# Patient Record
Sex: MALE | Race: WHITE | NOT HISPANIC OR LATINO | URBAN - METROPOLITAN AREA
[De-identification: names, ages, dates, MRNs, and addresses within clinical notes are randomized per-mention and may not be internally consistent; named-entity substitution may affect disease eponyms.]

---

## 2020-09-02 ENCOUNTER — OFFICE VISIT - RIVER FALLS (OUTPATIENT)
Dept: FAMILY MEDICINE | Facility: CLINIC | Age: 45
End: 2020-09-02

## 2020-09-02 ASSESSMENT — MIFFLIN-ST. JEOR: SCORE: 2046.55

## 2022-02-11 VITALS
DIASTOLIC BLOOD PRESSURE: 110 MMHG | WEIGHT: 260.4 LBS | SYSTOLIC BLOOD PRESSURE: 160 MMHG | HEART RATE: 82 BPM | TEMPERATURE: 99 F | BODY MASS INDEX: 38.57 KG/M2 | HEIGHT: 69 IN

## 2022-02-16 NOTE — NURSING NOTE
Comprehensive Intake Entered On:  9/2/2020 5:38 PM CDT    Performed On:  9/2/2020 5:34 PM CDT by Jo Glass CMA               Summary   Systolic Blood Pressure :   160 mmHg (HI)    Diastolic Blood Pressure :   110 mmHg (HI)    Mean Arterial Pressure :   127 mmHg   Jo Glass CMA - 9/2/2020 5:47 PM CDT   Chief Complaint :   New Patient: right ear pain, swelling into jaw x 3 days   Weight Measured :   260.4 lb(Converted to: 260 lb 6 oz, 118.12 kg)    Height Measured :   69 in(Converted to: 5 ft 9 in, 175.26 cm)    Body Mass Index :   38.45 kg/m2 (HI)    Body Surface Area :   2.4 m2   Peripheral Pulse Rate :   82 bpm   BP Site :   Right arm   Pulse Site :   Radial artery   BP Method :   Manual   HR Method :   Manual   Temperature Tympanic :   99.0 DegF(Converted to: 37.2 DegC)    Jo Glass CMA - 9/2/2020 5:34 PM CDT   Health Status   Tobacco Use? :   Never smoker   Jo Glass CMA - 9/2/2020 5:34 PM CDT   Consents   Consent for Immunization Exchange :   Consent Granted   Consent for Immunizations to Providers :   Consent Granted   Jo Glass CMA - 9/2/2020 5:34 PM CDT   Meds / Allergies   (As Of: 9/2/2020 5:38:39 PM CDT)   Allergies (Active)   amoxicillin  Estimated Onset Date:   Unspecified ; Created By:   Generated Domain User for [id number]; Reaction Status:   Active ; Substance:   amoxicillin ; Updated By:   Generated Domain User for [id number]; Reviewed Date:   9/2/2020 5:36 PM CDT        Medication List   (As Of: 9/2/2020 5:38:39 PM CDT)   No Known Home Medications     Jo Glass CMA - 9/2/2020 5:37:16 PM           ID Risk Screen   Recent Travel History :   No recent travel   Family Member Travel History :   No recent travel   Other Exposure to Infectious Disease :   Unknown   Jo Glass CMA - 9/2/2020 5:34 PM CDT   Social History   Social History   (As Of: 9/2/2020 5:38:39 PM CDT)   Tobacco:        Snuff   (Last Updated: 9/2/2020 5:37:28 PM CDT by Jo Glass CMA)

## 2022-02-16 NOTE — PROGRESS NOTES
Patient:   HOLLIS RAMIREZ            MRN: [id number]            FIN: [id number]               Age:   45 years     Sex:  Male     :  1975   Associated Diagnoses:   Acute exudative otitis media of right ear; External otitis of right ear   Author:   Wilver Miramontes PA-C      Report Summary   Diagnosis  Acute exudative otitis media of right ear ([id number] H66.001).  External otitis of right ear ([id number]-SD H60.91).  CoursePatient InstructionsOrders   Visit Information      Date of Service: 2020 05:32 pm  Performing Location: [facility]  Encounter#: [id number]   Visit type:  General concerns.    Accompanied by:  Spouse.    Source of history:  Self, Spouse.    History limitation:  None.       Chief Complaint   2020 5:34 PM CDT     New Patient: right ear pain, swelling into jaw x 3 days        History of Present Illness             The patient presents with earache.  The location of the earache is the right ear.  The earache is characterized by pain, discharge and sensation of fullness.  The severity of the earache is moderate.  The earache is episodic, fluctuates in intensity and is worsening.  The earache has lasted for 5 day(s).  Has psoriasis. Right ear canal swollen and draining. More painful. No fever. Mild nasal congestion..  Associated symptoms consist of facial pain, nasal congestion and denies fever.        Review of Systems   Constitutional:  Negative.    Eye:  Negative.    Ear/Nose/Mouth/Throat:  Negative except as documented in history of present illness.    Respiratory:  Negative.    Integumentary:  Negative except as documented in history of present illness.       Health Status   Allergies:    Allergic Reactions (Selected)  Severity Not Documented  Amoxicillin (No reactions were documented)   Medications:  (Selected)   Prescriptions  Prescribed  Floxin Otic 0.3% otic solution: 5 drop(s), Ear-Right, bid, x 7 day(s), # 5 mL, 0 Refill(s), Type: Acute, Pharmacy: [facility],   drop(s) Ear-Right bid,x7 day(s), 69, in, 09/02/20 17:34:00 CDT, Height Measured, 260.4, lb, 09/02/20 17:34:00 CDT, Weight Measured  cefdinir 300 mg oral capsule: = 1 cap(s) ( 300 mg ), PO, q12hr, x 10 day(s), # 20 cap(s), 0 Refill(s), Type: Acute, Pharmacy: [facility], 1 cap(s) Oral q12 hrs,x10 day(s), 69, in, 09/02/20 17:34:00 CDT, Height Measured, 260.4, lb, 09/02/20 17:34:00 CDT, Weight Measured   Problem list:    All Problems  Obesity, Unspecified / ICD-9-.00 / Probable  Anxiety State, Unspecified / ICD-9-.00 / Confirmed  Attention Deficit Disorder of Childhood without Mention of Hyperactivity / ICD-9-.00 / Confirmed  Benign Essential Hypertension / ICD-9-.1 / Confirmed  Tobacco Use Disorder / ICD-9-.1 / Probable      Histories   Past Medical History:    No active or resolved past medical history items have been selected or recorded.   Family History:    No family history items have been selected or recorded.   Procedure history:    No active procedure history items have been selected or recorded.   Social History:        Tobacco Assessment            Snuff        Physical Examination   Vital Signs   9/2/2020 5:34 PM CDT Temperature Tympanic 99.0 DegF    Peripheral Pulse Rate 82 bpm    Pulse Site Radial artery    HR Method Manual    Systolic Blood Pressure 160 mmHg  HI    Diastolic Blood Pressure 110 mmHg  HI    Mean Arterial Pressure 127 mmHg    BP Site Right arm    BP Method Manual      Measurements from flowsheet : Measurements   9/2/2020 5:34 PM CDT Height Measured - Standard 69 in    Weight Measured - Standard 260.4 lb    BSA 2.4 m2    Body Mass Index 38.45 kg/m2  HI      Eye:  Normal conjunctiva.    HENT:  Normocephalic, Oral mucosa is moist, No pharyngeal erythema.         Ear: Right ear, Canal ( Erythematous, Pain with pinna motion ), Tympanic membrane ( Intact, Erythematous, Fluid in middle ear ).         Nose: Both nostrils, Within normal limits, Discharge ( Small  amount, Clear ).    Neck:  Supple, Non-tender, No lymphadenopathy.       Impression and Plan   Diagnosis     Acute exudative otitis media of right ear ([id number] H66.001).     External otitis of right ear ([id number] H60.91).     Course:  Worsening.    Patient Instructions:       Counseled: Patient, Spouse, Regarding diagnosis, Regarding treatment, Regarding medications, Activity, Verbalized understanding.    Orders     Orders (Selected)   Prescriptions  Prescribed  Floxin Otic 0.3% otic solution: 5 drop(s), Ear-Right, bid, x 7 day(s), # 5 mL, 0 Refill(s), Type: Acute, Pharmacy: [facility], 5 drop(s) Ear-Right bid,x7 day(s), 69, in, 09/02/20 17:34:00 CDT, Height Measured, 260.4, lb, 09/02/20 17:34:00 CDT, Weight Measured  cefdinir 300 mg oral capsule: = 1 cap(s) ( 300 mg ), PO, q12hr, x 10 day(s), # 20 cap(s), 0 Refill(s), Type: Acute, Pharmacy: [facility], 1 cap(s) Oral q12 hrs,x10 day(s), 69, in, 09/02/20 17:34:00 CDT, Height Measured, 260.4, lb, 09/02/20 17:34:00 CDT, Weight Measured.     Take medicine as prescribed, side effects discussed.  Tylenol/ibuprofen for fever and discomfort.  Push fluids.  RTC if not improving in 36-48 hours, prior if concerns as we have discussed.

## 2022-02-16 NOTE — NURSING NOTE
Depression Screening Entered On:  9/3/2020 9:40 AM CDT    Performed On:  9/2/2020 9:39 AM CDT by Jo Glass CMA               Depression Screening   Little Interest - Pleasure in Activities :   Not at all   Feeling Down, Depressed, Hopeless :   Not at all   Initial Depression Screen Score :   0 Score   Poor Appetite or Overeating :   Several days   Trouble Falling or Staying Asleep :   Not at all   Feeling Tired or Little Energy :   Not at all   Feeling Bad About Yourself :   Not at all   Trouble Concentrating :   Not at all   Moving or Speaking Slowly :   Not at all   Thoughts Better Off Dead or Hurting Self :   Not at all   Detailed Depression Screen Score :   1    Total Depression Screen Score :   1    Jo Glass CMA - 9/3/2020 9:39 AM CDT

## 2022-02-16 NOTE — NURSING NOTE
CAGE Assessment Entered On:  9/3/2020 9:39 AM CDT    Performed On:  9/2/2020 9:39 AM CDT by Jo Glass CMA               Assessment   Have you ever felt you should cut down on your drinking :   No   Have people annoyed you by criticizing your drinking :   No   Have you ever felt bad or guilty about your drinking :   No   Have you ever taken a drink first thing in the morning to steady your nerves or get rid of a hangover (Eye-opener) :   No   CAGE Score :   0    Jo Glass CMA - 9/3/2020 9:39 AM CDT